# Patient Record
Sex: FEMALE | Race: BLACK OR AFRICAN AMERICAN | NOT HISPANIC OR LATINO | URBAN - METROPOLITAN AREA
[De-identification: names, ages, dates, MRNs, and addresses within clinical notes are randomized per-mention and may not be internally consistent; named-entity substitution may affect disease eponyms.]

---

## 2022-11-11 ENCOUNTER — OFFICE VISIT (OUTPATIENT)
Dept: PODIATRY | Facility: CLINIC | Age: 57
End: 2022-11-11
Payer: COMMERCIAL

## 2022-11-11 DIAGNOSIS — E11.49 TYPE II DIABETES MELLITUS WITH NEUROLOGICAL MANIFESTATIONS: Primary | ICD-10-CM

## 2022-11-11 DIAGNOSIS — R26.2 DIFFICULTY WALKING: ICD-10-CM

## 2022-11-11 DIAGNOSIS — L60.9 DISEASE OF NAIL: ICD-10-CM

## 2022-11-11 DIAGNOSIS — M79.671 PAIN IN RIGHT FOOT: ICD-10-CM

## 2022-11-11 DIAGNOSIS — D36.10 NEUROMA: ICD-10-CM

## 2022-11-11 PROBLEM — I63.9 CEREBROVASCULAR ACCIDENT (CVA): Status: ACTIVE | Noted: 2022-11-11

## 2022-11-11 PROBLEM — E11.9 TYPE 2 DIABETES MELLITUS: Status: ACTIVE | Noted: 2022-11-11

## 2022-11-11 PROCEDURE — 99203 PR OFFICE/OUTPT VISIT, NEW, LEVL III, 30-44 MIN: ICD-10-PCS | Mod: 25,S$GLB,, | Performed by: PODIATRIST

## 2022-11-11 PROCEDURE — 64455 NEUROMA INJECTION FOOT: ICD-10-PCS | Mod: RT,S$GLB,, | Performed by: PODIATRIST

## 2022-11-11 PROCEDURE — 99203 OFFICE O/P NEW LOW 30 MIN: CPT | Mod: 25,S$GLB,, | Performed by: PODIATRIST

## 2022-11-11 PROCEDURE — 64455 NJX AA&/STRD PLTR COM DG NRV: CPT | Mod: RT,S$GLB,, | Performed by: PODIATRIST

## 2022-11-11 RX ORDER — GLIPIZIDE 10 MG/1
10 TABLET, FILM COATED, EXTENDED RELEASE ORAL 2 TIMES DAILY
COMMUNITY
Start: 2022-09-21

## 2022-11-11 RX ORDER — METOPROLOL TARTRATE 25 MG/1
25 TABLET, FILM COATED ORAL 2 TIMES DAILY
COMMUNITY
Start: 2022-09-24

## 2022-11-11 RX ORDER — LANCETS 28 GAUGE
EACH MISCELLANEOUS
COMMUNITY
Start: 2022-09-23

## 2022-11-11 RX ORDER — ERGOCALCIFEROL 1.25 MG/1
50000 CAPSULE ORAL
COMMUNITY
Start: 2022-09-22

## 2022-11-11 RX ORDER — SITAGLIPTIN 100 MG/1
100 TABLET, FILM COATED ORAL DAILY
COMMUNITY
Start: 2022-09-21

## 2022-11-11 RX ORDER — LIDOCAINE HYDROCHLORIDE 10 MG/ML
1 INJECTION INFILTRATION; PERINEURAL
Status: DISCONTINUED | OUTPATIENT
Start: 2022-11-11 | End: 2022-11-11 | Stop reason: HOSPADM

## 2022-11-11 RX ORDER — BLOOD SUGAR DIAGNOSTIC
STRIP MISCELLANEOUS
COMMUNITY
Start: 2022-10-21

## 2022-11-11 RX ORDER — AMLODIPINE BESYLATE 10 MG/1
10 TABLET ORAL
COMMUNITY
Start: 2022-09-21

## 2022-11-11 RX ORDER — TELMISARTAN AND HYDROCHLORTHIAZIDE 80; 25 MG/1; MG/1
TABLET ORAL
COMMUNITY
Start: 2022-09-21 | End: 2022-12-23

## 2022-11-11 RX ORDER — INSULIN GLARGINE 100 [IU]/ML
INJECTION, SOLUTION SUBCUTANEOUS
COMMUNITY
Start: 2022-09-23

## 2022-11-11 RX ORDER — NAPROXEN SODIUM 220 MG/1
TABLET, FILM COATED ORAL
COMMUNITY

## 2022-11-11 RX ORDER — ROSUVASTATIN CALCIUM 40 MG/1
40 TABLET, COATED ORAL
COMMUNITY
Start: 2022-09-21 | End: 2022-12-23

## 2022-11-11 RX ORDER — CLOPIDOGREL BISULFATE 75 MG/1
75 TABLET ORAL
COMMUNITY
Start: 2022-09-21 | End: 2022-12-23

## 2022-11-11 RX ORDER — DEXAMETHASONE SODIUM PHOSPHATE 4 MG/ML
4 INJECTION, SOLUTION INTRA-ARTICULAR; INTRALESIONAL; INTRAMUSCULAR; INTRAVENOUS; SOFT TISSUE
Status: DISCONTINUED | OUTPATIENT
Start: 2022-11-11 | End: 2022-11-11 | Stop reason: HOSPADM

## 2022-11-11 RX ORDER — GLUCOSAM/CHON-MSM1/C/MANG/BOSW 500-416.6
TABLET ORAL
COMMUNITY
Start: 2022-09-24

## 2022-11-11 RX ORDER — HYDRALAZINE HYDROCHLORIDE 25 MG/1
25 TABLET, FILM COATED ORAL 2 TIMES DAILY
COMMUNITY
Start: 2022-09-21

## 2022-11-11 RX ORDER — EPINEPHRINE 0.3 MG/.3ML
0.3 INJECTION SUBCUTANEOUS
COMMUNITY
Start: 2021-09-27 | End: 2025-05-11

## 2022-11-11 RX ORDER — INSULIN ASPART 100 [IU]/ML
INJECTION, SOLUTION INTRAVENOUS; SUBCUTANEOUS
COMMUNITY
Start: 2022-10-21

## 2022-11-11 RX ORDER — BLOOD-GLUCOSE METER
EACH MISCELLANEOUS
COMMUNITY
Start: 2022-09-24

## 2022-11-11 RX ADMIN — LIDOCAINE HYDROCHLORIDE 1 ML: 10 INJECTION INFILTRATION; PERINEURAL at 03:11

## 2022-11-11 RX ADMIN — DEXAMETHASONE SODIUM PHOSPHATE 4 MG: 4 INJECTION, SOLUTION INTRA-ARTICULAR; INTRALESIONAL; INTRAMUSCULAR; INTRAVENOUS; SOFT TISSUE at 03:11

## 2022-11-11 NOTE — PROGRESS NOTES
Subjective:      Patient ID: Austin Garcia is a 57 y.o. female.    Chief Complaint: Diabetes Mellitus and Foot Pain    Austin is a 57 y.o. female who presents to the podiatry clinic  with complaint of  right foot pain. Onset of the symptoms was about a month ago. Precipitating event:  use of uncomfortable shoes . Current symptoms include: ability to bear weight, but with some pain, worsening symptoms after a period of activity, and pain shooting between the third and fourth toe of the right foot . Aggravating factors:  prolonged ambulation/ standing . Symptoms have gradually worsened. Patient has had no prior foot problems. Evaluation to date: none. Treatment to date: rest. Patients rates pain 3/10 on pain scale.    Review of Systems   Constitutional: Negative for chills and fever.   Cardiovascular:  Negative for chest pain and leg swelling.   Respiratory:  Negative for cough and shortness of breath.    Gastrointestinal:  Negative for diarrhea, nausea and vomiting.   Neurological:  Positive for paresthesias.         Objective:      Physical Exam  Constitutional:       General: She is not in acute distress.     Appearance: Normal appearance. She is not ill-appearing.   HENT:      Nose: Nose normal.   Pulmonary:      Effort: Pulmonary effort is normal. No respiratory distress.   Skin:     Capillary Refill: Capillary refill takes 2 to 3 seconds.   Neurological:      Mental Status: She is alert and oriented to person, place, and time.   Psychiatric:         Mood and Affect: Mood normal.         Behavior: Behavior normal.         Thought Content: Thought content normal.         Judgment: Judgment normal.     Neurologic:  Protective light touch sensation intact bilateral lower extremity, positive paresthesias reported related to previous stroke, positive Shreya sign to the right 3rd interspace, positive Tinel sign right 3rd interspace   Musculoskeletal:  5/5 muscle strength noted bilateral foot, ankle joint range of  motion is mildly reduced but without pain, pain and tenderness with palpation of the right 3rd interspace   Vascular:  DP and PT pulses palpable 2/4 bilateral foot, capillary refill time less 3 seconds to distal aspect the digits, no edema noted bilateral foot, skin temperature gradient within normal limits from proximal to distal bilateral foot, pedal hair growth is diminished to the digits bilateral foot   Dermatologic:  No open lesions noted bilateral foot, no rashes noted bilateral foot, bilateral 5th digit and bilateral 1st digit nail plate are thickened and discolored,          Assessment:       Encounter Diagnoses   Name Primary?    Type II diabetes mellitus with neurological manifestations Yes    Neuroma     Disease of nail     Pain in right foot     Difficulty walking          Plan:       Austin was seen today for diabetes mellitus and foot pain.    Diagnoses and all orders for this visit:    Type II diabetes mellitus with neurological manifestations    Neuroma  -     Neuroma Injection Foot    Disease of nail    Pain in right foot  -     Neuroma Injection Foot    Difficulty walking  -     Neuroma Injection Foot    I counseled the patient on her conditions, their implications and medical management.        1. Patient was examined and evaluated.    2. Discussed with patient etiology of neuritis versus neuroma symptoms.  Discussed local steroid injection for reduction of pain in the area.  Patient had offloading metatarsal bar placed beneath the right 3rd 4th and 5th MPJs for reduction of pressure in these areas.  Patient was advised to alter shoe gear for better comfort and fit.    Neuroma Injection Foot    Date/Time: 11/11/2022 3:45 PM  Performed by: Adi Ken DPM  Authorized by: Adi Ken DPM     Consent Done?:  Yes (Verbal)  Indications:  Pain  Location Right Foot:  Third Webspace  Needle size:  25 G  Approach:  Dorsal  Medications:  1 mL LIDOcaine HCL 10 mg/ml (1%) 10 mg/mL (1 %); 4 mg  dexAMETHasone 4 mg/mL  Patient tolerance:  Patient tolerated the procedure well with no immediate complications   3. Discussed with patient etiology of nail fungus changes.  Conservative OTC treatments such as Vicks vapor rub, tea tree oil, and original Listerine were discussed.    4. Patient was advised to perform daily foot monitoring.  Patient was advised continue efforts at proper glycemic control, lowering hemoglobin A1c, and adherence to diabetic medication regimen.    5. Patient will follow-up in 10 weeks p.r.n. for complaints

## 2022-12-07 ENCOUNTER — OFFICE VISIT (OUTPATIENT)
Dept: PODIATRY | Facility: CLINIC | Age: 57
End: 2022-12-07
Payer: COMMERCIAL

## 2022-12-07 VITALS — SYSTOLIC BLOOD PRESSURE: 179 MMHG | DIASTOLIC BLOOD PRESSURE: 87 MMHG | HEART RATE: 102 BPM

## 2022-12-07 DIAGNOSIS — G57.91 NEURITIS OF RIGHT FOOT: ICD-10-CM

## 2022-12-07 DIAGNOSIS — R26.2 DIFFICULTY WALKING: ICD-10-CM

## 2022-12-07 DIAGNOSIS — D36.10 NEUROMA: Primary | ICD-10-CM

## 2022-12-07 DIAGNOSIS — M79.671 PAIN IN RIGHT FOOT: ICD-10-CM

## 2022-12-07 PROCEDURE — 64455 NJX AA&/STRD PLTR COM DG NRV: CPT | Mod: RT,S$GLB,, | Performed by: PODIATRIST

## 2022-12-07 PROCEDURE — 64455 NEUROMA INJECTION FOOT: ICD-10-PCS | Mod: RT,S$GLB,, | Performed by: PODIATRIST

## 2022-12-07 PROCEDURE — 99213 PR OFFICE/OUTPT VISIT, EST, LEVL III, 20-29 MIN: ICD-10-PCS | Mod: 25,S$GLB,, | Performed by: PODIATRIST

## 2022-12-07 PROCEDURE — 99213 OFFICE O/P EST LOW 20 MIN: CPT | Mod: 25,S$GLB,, | Performed by: PODIATRIST

## 2022-12-07 RX ORDER — DEXAMETHASONE SODIUM PHOSPHATE 4 MG/ML
4 INJECTION, SOLUTION INTRA-ARTICULAR; INTRALESIONAL; INTRAMUSCULAR; INTRAVENOUS; SOFT TISSUE
Status: DISCONTINUED | OUTPATIENT
Start: 2022-12-07 | End: 2022-12-07 | Stop reason: HOSPADM

## 2022-12-07 RX ORDER — LIDOCAINE HYDROCHLORIDE 10 MG/ML
1 INJECTION INFILTRATION; PERINEURAL
Status: DISCONTINUED | OUTPATIENT
Start: 2022-12-07 | End: 2022-12-07 | Stop reason: HOSPADM

## 2022-12-07 RX ADMIN — DEXAMETHASONE SODIUM PHOSPHATE 4 MG: 4 INJECTION, SOLUTION INTRA-ARTICULAR; INTRALESIONAL; INTRAMUSCULAR; INTRAVENOUS; SOFT TISSUE at 01:12

## 2022-12-07 RX ADMIN — LIDOCAINE HYDROCHLORIDE 1 ML: 10 INJECTION INFILTRATION; PERINEURAL at 01:12

## 2022-12-07 NOTE — PROGRESS NOTES
Subjective:      Patient ID: Austin Garcia is a 57 y.o. female.    Chief Complaint: Foot Pain    Austin is a 57 y.o. female who presents to the podiatry clinic  with complaint of  right foot pain. Onset of the symptoms was about a month ago. Precipitating event: none known. Current symptoms include: ability to bear weight, but with some pain, worsening symptoms after a period of activity, and sharp shooting pain between right 2nd, 3rd and 4th toes . Aggravating factors:  ill-fitted shoes and prolonged walking/ standing . Symptoms have waxed and waned. Patient has had prior foot problems. Evaluation to date: none. Treatment to date: corticosteroid injection which was effective. Patients rates pain 10/10 on pain scale.    Review of Systems   Constitutional: Negative for chills and fever.   Cardiovascular:  Negative for chest pain and leg swelling.   Respiratory:  Negative for cough and shortness of breath.    Gastrointestinal:  Negative for diarrhea, nausea and vomiting.   Neurological:  Positive for numbness and paresthesias.         Objective:      Physical Exam  Vitals reviewed.   Constitutional:       General: She is not in acute distress.     Appearance: Normal appearance. She is not ill-appearing.   HENT:      Nose: Nose normal.   Pulmonary:      Effort: Pulmonary effort is normal. No respiratory distress.   Skin:     Capillary Refill: Capillary refill takes 2 to 3 seconds.   Neurological:      Mental Status: She is alert and oriented to person, place, and time.   Psychiatric:         Mood and Affect: Mood normal.         Behavior: Behavior normal.         Thought Content: Thought content normal.         Judgment: Judgment normal.     Neurologic:  Protective light touch sensation intact bilateral lower extremity, positive paresthesias reported related to previous stroke, positive Shreya sign to the right 3rd and 2nd interspace, positive Tinel sign right 3rd and 2nd interspace   Musculoskeletal:  5/5 muscle  strength noted bilateral foot, ankle joint range of motion is mildly reduced but without pain, pain and tenderness with palpation of the right 2nd and 3rd interspace   Vascular:  DP and PT pulses palpable 2/4 bilateral foot, capillary refill time less 3 seconds to distal aspect the digits, no edema noted bilateral foot, skin temperature gradient within normal limits from proximal to distal bilateral foot, pedal hair growth is diminished to the digits bilateral foot   Dermatologic:  No open lesions noted bilateral foot, no rashes noted bilateral foot, bilateral 5th digit and bilateral 1st digit nail plate are thickened and discolored,        Assessment:       Encounter Diagnoses   Name Primary?    Neuroma Yes    Neuritis of right foot     Pain in right foot     Difficulty walking          Plan:       Austin was seen today for foot pain.    Diagnoses and all orders for this visit:    Neuroma  -     Neuroma Injection Foot    Neuritis of right foot  -     Neuroma Injection Foot    Pain in right foot  -     Neuroma Injection Foot    Difficulty walking  -     Neuroma Injection Foot    I counseled the patient on her conditions, their implications and medical management.        1. Patient was examined and evaluated  2. Again discussed the etiology of neuritis versus neuroma symptoms with the patient.  Discussed oral Medrol Dosepak versus local steroid injection.  Patient was advised to decrease the amount of barefoot walking and/or use of flip-flops.  Patient had a offloading metatarsal bar placed beneath the plantar right forefoot.  Patient will continue with comfortable shoe gear both inside and outside the home.  Neuroma Injection Foot    Date/Time: 12/7/2022 1:00 PM  Performed by: Adi Ken DPM  Authorized by: Adi Ken DPM     Consent Done?:  Yes (Verbal)  Indications:  Pain  Location Right Foot:  Third Webspace  Needle size:  25 G  Approach:  Dorsal  Medications:  1 mL LIDOcaine HCL 10 mg/ml (1%) 10  mg/mL (1 %); 4 mg dexAMETHasone 4 mg/mL  Patient tolerance:  Patient tolerated the procedure well with no immediate complications    3. Patient was advised to adjunct with OTC analgesics for pain relief  4. Patient follow up in 2 weeks or p.r.n. for complaints

## 2022-12-21 ENCOUNTER — OFFICE VISIT (OUTPATIENT)
Dept: PODIATRY | Facility: CLINIC | Age: 57
End: 2022-12-21
Payer: COMMERCIAL

## 2022-12-21 VITALS
OXYGEN SATURATION: 100 % | DIASTOLIC BLOOD PRESSURE: 70 MMHG | SYSTOLIC BLOOD PRESSURE: 134 MMHG | BODY MASS INDEX: 35.19 KG/M2 | HEIGHT: 61 IN | HEART RATE: 71 BPM | RESPIRATION RATE: 19 BRPM | WEIGHT: 186.38 LBS

## 2022-12-21 DIAGNOSIS — E11.49 TYPE II DIABETES MELLITUS WITH NEUROLOGICAL MANIFESTATIONS: Primary | ICD-10-CM

## 2022-12-21 DIAGNOSIS — E11.42 DIABETIC POLYNEUROPATHY ASSOCIATED WITH TYPE 2 DIABETES MELLITUS: ICD-10-CM

## 2022-12-21 DIAGNOSIS — L60.9 DISEASE OF NAIL: ICD-10-CM

## 2022-12-21 PROCEDURE — 99213 PR OFFICE/OUTPT VISIT, EST, LEVL III, 20-29 MIN: ICD-10-PCS | Mod: 25,S$GLB,, | Performed by: PODIATRIST

## 2022-12-21 PROCEDURE — 11721 ROUTINE FOOT CARE: ICD-10-PCS | Mod: S$GLB,,, | Performed by: PODIATRIST

## 2022-12-21 PROCEDURE — 99213 OFFICE O/P EST LOW 20 MIN: CPT | Mod: 25,S$GLB,, | Performed by: PODIATRIST

## 2022-12-21 PROCEDURE — 11721 DEBRIDE NAIL 6 OR MORE: CPT | Mod: S$GLB,,, | Performed by: PODIATRIST

## 2022-12-21 NOTE — PROGRESS NOTES
Subjective:      Patient ID: Austin Garcia is a 57 y.o. female.    Chief Complaint: Foot Pain    Austin is a 57 y.o. female who presents to the clinic for evaluation and treatment of high risk feet. Austin has a past medical history of Diabetes mellitus, type 2 and Hypertension. The patient's chief complaint is long, thick toenails. This patient has documented high risk feet requiring routine maintenance secondary to diabetes mellitis and those secondary complications of diabetes, as mentioned.  Patient is following up for previous injection for neuroma pain to the right foot.  Patient states that the injection was very effective and she currently has no pain from her right foot.  Patient does however continue to complain of occasional tingling and burning from both feet distally.    PCP: Sowmya Nazario MD    Date Last Seen by PCP: 10/17/2022    Current shoe gear:  Affected Foot: Tennis shoes     Unaffected Foot: Tennis shoes    No results found for: HGBA1C    Review of Systems   Constitutional: Negative for chills and fever.   Cardiovascular:  Negative for chest pain and leg swelling.   Respiratory:  Negative for cough and shortness of breath.    Gastrointestinal:  Negative for diarrhea, nausea and vomiting.   Neurological:  Positive for numbness and paresthesias.         Objective:      Physical Exam  Vitals reviewed.   Constitutional:       General: She is not in acute distress.     Appearance: Normal appearance. She is not ill-appearing.   HENT:      Nose: Nose normal.   Cardiovascular:      Rate and Rhythm: Normal rate.   Pulmonary:      Effort: Pulmonary effort is normal. No respiratory distress.   Skin:     Capillary Refill: Capillary refill takes 2 to 3 seconds.   Neurological:      Mental Status: She is alert and oriented to person, place, and time.   Psychiatric:         Mood and Affect: Mood normal.         Behavior: Behavior normal.         Thought Content: Thought content normal.         Judgment:  Judgment normal.     Neurologic:  Protective and light touch sensation intact bilateral lower extremity, positive paresthesias reported related to previous stroke,   Musculoskeletal:  5/5 muscle strength noted bilateral foot, ankle joint range of motion is mildly reduced but without pain, pain and tenderness with palpation of the right 2nd and 3rd interspace   Vascular:  DP and PT pulses palpable 2/4 bilateral foot, capillary refill time less 3 seconds to distal aspect the digits, mild edema noted bilateral foot, skin temperature gradient within normal limits from proximal to distal bilateral foot, pedal hair growth is diminished to the digits bilateral foot   Dermatologic:  No open lesions noted bilateral foot, no rashes noted bilateral foot, bilateral 5th digit and bilateral 1st digit nail plate are thickened and discolored, mild hyperpigmentation noted bilateral lower 1/3 of the leg           Assessment:       Encounter Diagnoses   Name Primary?    Type II diabetes mellitus with neurological manifestations Yes    Disease of nail     Diabetic polyneuropathy associated with type 2 diabetes mellitus          Plan:       Austin was seen today for foot pain.    Diagnoses and all orders for this visit:    Type II diabetes mellitus with neurological manifestations  -     Routine Foot Care    Disease of nail  -     Routine Foot Care    Diabetic polyneuropathy associated with type 2 diabetes mellitus  -     Routine Foot Care      I counseled the patient on her conditions, their implications and medical management.        1. Patient was examined and evaluated.    2. Discussed with patient etiology of onychomycosis.  Patient was advised to attempt OTC topical conservative therapy such as Vicks vapor rub and tea tree oil.  Discussed with patient risks and benefits of oral terbinafine.  Also discussed with patient prescription topical medications such as Penlac and Jublia.    Routine Foot Care    Date/Time: 12/21/2022 1:00  PM  Performed by: Adi Ken DPM  Authorized by: Adi Ken DPM     Consent Done?:  Yes (Verbal)  Hyperkeratotic Skin Lesions?: No      Nail Care Type:  Debride  Location(s): All  (Left 1st Toe, Left 3rd Toe, Left 2nd Toe, Left 4th Toe, Left 5th Toe, Right 1st Toe, Right 2nd Toe, Right 3rd Toe, Right 4th Toe and Right 5th Toe)  Patient tolerance:  Patient tolerated the procedure well with no immediate complications    3. Patient was advised to continue monitoring the feet daily.  Patient will continue efforts of proper glycemic control, lowering hemoglobin A1c, and adherence to diabetic medication regimen.  Patient will continue with comfortable shoe gear both inside and outside the home  4. Discussed with patient possible return of neuritis versus neuroma symptoms from the right foot.  Patient made aware that secondary to improved pain from previous steroid injections we will monitor the foot for recurrence.  Patient did have offloading metatarsal bar placed on the plantar aspect of the right foot  5. Patient will follow-up in 3 months or p.r.n. for complaints

## 2023-01-20 ENCOUNTER — OFFICE VISIT (OUTPATIENT)
Dept: PODIATRY | Facility: CLINIC | Age: 58
End: 2023-01-20
Payer: COMMERCIAL

## 2023-01-20 VITALS
HEIGHT: 61 IN | WEIGHT: 186 LBS | BODY MASS INDEX: 35.12 KG/M2 | DIASTOLIC BLOOD PRESSURE: 79 MMHG | HEART RATE: 70 BPM | SYSTOLIC BLOOD PRESSURE: 175 MMHG

## 2023-01-20 DIAGNOSIS — M79.671 PAIN IN RIGHT FOOT: ICD-10-CM

## 2023-01-20 DIAGNOSIS — R26.2 DIFFICULTY WALKING: ICD-10-CM

## 2023-01-20 DIAGNOSIS — G57.91 NEURITIS OF RIGHT FOOT: ICD-10-CM

## 2023-01-20 DIAGNOSIS — D36.10 NEUROMA: Primary | ICD-10-CM

## 2023-01-20 PROCEDURE — 64455 NEUROMA INJECTION FOOT: ICD-10-PCS | Mod: RT,S$GLB,, | Performed by: PODIATRIST

## 2023-01-20 PROCEDURE — 99213 OFFICE O/P EST LOW 20 MIN: CPT | Mod: 25,S$GLB,, | Performed by: PODIATRIST

## 2023-01-20 PROCEDURE — 64455 NJX AA&/STRD PLTR COM DG NRV: CPT | Mod: RT,S$GLB,, | Performed by: PODIATRIST

## 2023-01-20 PROCEDURE — 99213 PR OFFICE/OUTPT VISIT, EST, LEVL III, 20-29 MIN: ICD-10-PCS | Mod: 25,S$GLB,, | Performed by: PODIATRIST

## 2023-01-20 RX ORDER — TELMISARTAN AND HYDROCHLORTHIAZIDE 80; 25 MG/1; MG/1
TABLET ORAL
COMMUNITY
Start: 2022-11-17

## 2023-01-20 RX ORDER — LIDOCAINE HYDROCHLORIDE 10 MG/ML
1 INJECTION INFILTRATION; PERINEURAL
Status: DISCONTINUED | OUTPATIENT
Start: 2023-01-20 | End: 2023-01-20 | Stop reason: HOSPADM

## 2023-01-20 RX ORDER — DAPAGLIFLOZIN 5 MG/1
5 TABLET, FILM COATED ORAL EVERY MORNING
COMMUNITY
Start: 2023-01-10

## 2023-01-20 RX ORDER — DEXAMETHASONE SODIUM PHOSPHATE 4 MG/ML
4 INJECTION, SOLUTION INTRA-ARTICULAR; INTRALESIONAL; INTRAMUSCULAR; INTRAVENOUS; SOFT TISSUE
Status: DISCONTINUED | OUTPATIENT
Start: 2023-01-20 | End: 2023-01-20 | Stop reason: HOSPADM

## 2023-01-20 RX ADMIN — DEXAMETHASONE SODIUM PHOSPHATE 4 MG: 4 INJECTION, SOLUTION INTRA-ARTICULAR; INTRALESIONAL; INTRAMUSCULAR; INTRAVENOUS; SOFT TISSUE at 08:01

## 2023-01-20 RX ADMIN — LIDOCAINE HYDROCHLORIDE 1 ML: 10 INJECTION INFILTRATION; PERINEURAL at 08:01

## 2023-01-20 NOTE — PROGRESS NOTES
Subjective:      Patient ID: Austin Garcia is a 57 y.o. female.    Chief Complaint: Foot Pain and Follow-up    Austin is a 57 y.o. female who presents to the podiatry clinic  with complaint of  right foot pain. Onset of the symptoms was several weeks ago. Precipitating event: increased activity and return of previous neuroma/ neuritis symptoms . Current symptoms include: ability to bear weight, but with some pain, worsening symptoms after a period of activity, and burning/ sharp shooting pains through 3rd toe . Aggravating factors:  ill fitted shoes and walking . Symptoms have been intermittent. Patient has had prior foot problems. Treatment to date: corticosteroid injection which was effective. Patients rates pain 4/10 on pain scale.    Review of Systems   Constitutional: Negative for chills and fever.   Cardiovascular:  Negative for chest pain and leg swelling.   Respiratory:  Negative for cough and shortness of breath.    Gastrointestinal:  Negative for diarrhea, nausea and vomiting.   Neurological:  Positive for numbness and paresthesias.         Objective:      Physical Exam  Vitals reviewed.   Constitutional:       General: She is not in acute distress.     Appearance: Normal appearance. She is not ill-appearing.   HENT:      Head: Normocephalic.      Nose: Nose normal.   Cardiovascular:      Rate and Rhythm: Normal rate.   Pulmonary:      Effort: Pulmonary effort is normal. No respiratory distress.   Skin:     Capillary Refill: Capillary refill takes 2 to 3 seconds.   Neurological:      Mental Status: She is alert and oriented to person, place, and time.   Psychiatric:         Mood and Affect: Mood normal.         Behavior: Behavior normal.         Thought Content: Thought content normal.         Judgment: Judgment normal.     Neurologic:  Protective light touch sensation intact bilateral lower extremity, positive paresthesias reported related to previous stroke, positive Shreya sign to the right 3rd and 2nd  interspace, positive Tinel sign right 3rd and 2nd interspace   Musculoskeletal:  5/5 muscle strength noted bilateral foot, ankle joint range of motion is mildly reduced but without pain, pain and tenderness with palpation of the right 2nd and 3rd interspace   Vascular:  DP and PT pulses palpable 2/4 bilateral foot, capillary refill time less 3 seconds to distal aspect the digits, no edema noted bilateral foot, skin temperature gradient within normal limits from proximal to distal bilateral foot, pedal hair growth is diminished to the digits bilateral foot   Dermatologic:  No open lesions noted bilateral foot, no rashes noted bilateral foot, bilateral 5th digit and bilateral 1st digit nail plate are thickened and discolored,          Assessment:       Encounter Diagnoses   Name Primary?    Neuroma Yes    Neuritis of right foot     Pain in right foot     Difficulty walking          Plan:       Austin was seen today for foot pain and follow-up.    Diagnoses and all orders for this visit:    Neuroma  -     Neuroma Injection Foot    Neuritis of right foot  -     Neuroma Injection Foot    Pain in right foot  -     Neuroma Injection Foot    Difficulty walking  -     Neuroma Injection Foot      I counseled the patient on her conditions, their implications and medical management.        1. Patient was examined and evaluated  2. Again discussed the etiology of neuritis versus neuroma symptoms with the patient.  Discussed oral Medrol Dosepak versus local steroid injection.  Patient was advised to decrease the amount of barefoot walking and/or use of flip-flops.  Patient had a offloading metatarsal bar placed beneath the plantar right forefoot.  Patient will continue with comfortable shoe gear both inside and outside the home.  Neuroma Injection Foot    Date/Time: 1/20/2023 8:15 AM  Performed by: Adi Ken DPM  Authorized by: Adi Ken DPM     Indications:  Pain  Location Right Foot:  Third [employer]space  Needle  size:  25 G  Approach:  Dorsal  Medications:  1 mL LIDOcaine HCL 10 mg/ml (1%) 10 mg/mL (1 %); 4 mg dexAMETHasone 4 mg/mL  Patient tolerance:  Patient tolerated the procedure well with no immediate complications    3. Patient was advised to adjunct with OTC analgesics for pain relief  4. Patient follow up in 4 weeks or p.r.n. for complaints

## 2023-02-13 PROBLEM — I63.9 CEREBROVASCULAR ACCIDENT (CVA): Status: RESOLVED | Noted: 2022-11-11 | Resolved: 2023-02-13
